# Patient Record
Sex: FEMALE | Race: WHITE
[De-identification: names, ages, dates, MRNs, and addresses within clinical notes are randomized per-mention and may not be internally consistent; named-entity substitution may affect disease eponyms.]

---

## 2017-03-28 ENCOUNTER — HOSPITAL ENCOUNTER (EMERGENCY)
Dept: HOSPITAL 11 - JP.ED | Age: 67
Discharge: HOME | End: 2017-03-28
Payer: MEDICARE

## 2017-03-28 VITALS — SYSTOLIC BLOOD PRESSURE: 167 MMHG | DIASTOLIC BLOOD PRESSURE: 83 MMHG

## 2017-03-28 DIAGNOSIS — Z79.899: ICD-10-CM

## 2017-03-28 DIAGNOSIS — M96.830: Primary | ICD-10-CM

## 2017-03-28 DIAGNOSIS — Z79.82: ICD-10-CM

## 2017-03-28 DIAGNOSIS — I10: ICD-10-CM

## 2017-03-28 NOTE — EDM.PDOC
[id number]   4d


LT LEG MOLE REMOVED/BLEEDING


Time Seen by Provider: 17 22:45


Source of Information: Reports: Patient, Family


History Limitations: Reports: No limitations





- History of Present Illness


INITIAL COMMENTS - FREE TEXT/NARRATIVE: 





66-year-old female with a recent small surgical procedure behind her left knee 

presents this she is concerned it might be bleeding.  She was told to come in 

if it bleeds uncontrollably.


Onset: today


  ** Left Lower Leg


Pain Score (Numeric/FACES): 3





- Related Data


 Allergies











Allergy/AdvReac Type Severity Reaction Status Date / Time


 


No Known Allergies Allergy   Verified 17 22:47











Home Meds: 


 Home Meds





Aspirin [Adult Low Dose Aspirin EC] 81 mg PO DAILY 16 [History]


Lisinopril [Lisinopril] 5 mg PO DAILY 16 [History]


Metoprolol Succinate [Metoprolol Succinate] 50 mg PO DAILY 16 [History]


Albuterol [IMW: Albuterol HFA] 2 puff INH Q4H PRN 16 [History]


Cholecalciferol (Vitamin D3) [Vitamin D] 5,000 unit PO DAILY 16 [History]


Multivitamin [Multivitamins] 1 tab PO DAILY 16 [History]


Betamethasone/Propylene Glyc [Betamethasone DP Aug 0.05%] 30 ml TP ASDIRECTED 10

/27/16 [History]


Hydrochlorothiazide [Hydrochlorothiazide] 1 tab PO DAILY 17 [History]











Past Medical History


Cardiovascular History: Reports: Hypertension


Respiratory History: Reports: Bronchitis, recurrent


Genitourinary History: Reports: Renal calculus, Other (see below)


Other Genitourinary History: kidney stone removed


OB/GYN History: Reports: Pregnancy


Musculoskeletal History: Reports: Other (see below)


Other Musculoskeletal History: sciatica


Dermatologic History: Reports: Eczema, Other (see below)


Other Dermatologic History: skin rash for swimmers itch.  recent excema scalp





- Infectious Disease History


Infectious Disease History: Reports: Chicken pox





- Past Surgical History


Female  Surgical History: Reports:  section





Social & Family History





- Family History


Family Medical History: Unobtainable





- Tobacco Use


Smoking Status *Q: Never Smoker


Second Hand Smoke Exposure: No





- Caffeine Use


Caffeine Use: Reports: Soda





- Alcohol Use


Days Per Week of Alcohol Use: 1


Number of Drinks Per Day: 2


Total Drinks Per Week: 2





- Recreational Drug Use


Recreational Drug Use: No


Drug Use in Last 12 Months: Yes





- Living Situation & Occupation


Living situation: Reports: , with spouse





ED ROS GENERAL





- Review of Systems


Review Of Systems: See Below


Constitutional: Denies: fever, chills


Respiratory: Denies: Shortness of Breath


Cardiovascular: Denies: Chest pain


GI/Abdominal: Denies: Abdominal pain


Skin: Reports: other (See HPI)





ED EXAM, GENERAL





- Physical Exam


Exam: See Below


Free Text/Narrative:: 





Patient has a large Band-Aid covering a recent surgical procedure removing a 

small mole behind her right knee.  There does appear to be a small amount of 

blood staining under the Band-Aid but nothing soaking through or oozing around 

the dressings.





Course





- Vital Signs


Last Recorded V/S: 


 Last Vital Signs











Temp  97.6 F   17 23:03


 


Pulse  71   17 23:03


 


Resp  16   17 23:03


 


BP  167/83 H  17 23:03


 


Pulse Ox  94 L  17 23:03














- Re-Assessments/Exams


Free Text/Narrative Re-Assessment/Exam: 





17 23:03


Patient was instructed by her primary doctor who did the procedure to change 

the Band-Aids once daily starting tomorrow morning.  I don't see any reason to 

change that schedule, she does not need an urgent dressing change at this time.

  She can return tonight if bleeding increases or starts to bleed through the 

bandages.





Departure





- Departure


Time of Disposition: 23:13


Disposition: Home, Self-Care 01


Condition: good


Clinical Impression: 


 Postoperative bleeding from incision





Instructions:  Mole Excision, Care After


Referrals: 


Nedra Camejo NP [Primary Care Provider] - 


Forms:  ED Department Discharge


Care Plan Goals: 


Keep wound covered with current bandage unless bleeding soaks through bandages 

or seems to be worsening.  Some pain is to be expected and is not as concerning 

as if the bleeding increases.  Change bandages tomorrow as scheduled.

## 2017-04-01 ENCOUNTER — HOSPITAL ENCOUNTER (EMERGENCY)
Dept: HOSPITAL 11 - JP.ED | Age: 67
Discharge: HOME | End: 2017-04-01
Payer: MEDICARE

## 2017-04-01 DIAGNOSIS — Z98.890: ICD-10-CM

## 2017-04-01 DIAGNOSIS — Z79.899: ICD-10-CM

## 2017-04-01 DIAGNOSIS — Z79.82: ICD-10-CM

## 2017-04-01 DIAGNOSIS — Z48.01: Primary | ICD-10-CM

## 2017-04-01 DIAGNOSIS — I10: ICD-10-CM

## 2017-04-01 NOTE — EDM.PDOC
ED HPI Skin/Rash





- General


Chief Complaint: Wound Recheck


Stated Complaint: INFECTION


Time Seen by Provider: 17 17:59


Source: Reports: Patient


History Limitations: Reports: No limitations





- History of Present Illness


INITIAL COMMENTS - FREE TEXT/NARRATIVE: 





History of present illness:


[Patient is here for a wound check.  She had a skin pain removed behind her 

left knee.  She saw her primary that had removed it and started on Bactrim 

because of concerns of infection.  She's been working in the yard all day and 

is just concerned that it is more infected and wanted someone to look at.  She'

s had no fevers she has other complaints.]





Review of systems: 


As per history of present illness and below otherwise all systems reviewed and 

negative.





Past medical history: 


As per history of present illness and as reviewed below otherwise 

noncontributory.





Surgical history: 


As per history of present illness and as reviewed below otherwise 

noncontributory.





Social history: 


No reported history of drug or alcohol abuse.





Family history: 


As per history of present illness and as reviewed below otherwise 

noncontributory.





Physical exam:


HEENT: Atraumatic, normocephalic, 


Lungs: Clear to auscultation


Heart: S1S2, regular


Extremities: Behind her left knee in the popliteal fossa she does have area of 

redness from where the tag was removed.  There is some erythema about the area 

but I think it outlines the bandage that was on there so maybe a slight 

allergic reaction from that.  Overall does not appear to be infected.


Neuro: Awake, alert, oriented. 





Diagnostics:


[]





Therapeutics:


[]





Impression: 


[Wound check followup]





Plan:


[Am recommending that when she showers she washes it with soap and water 

something she has not done yet.  And then use a little antibiotic ointment on 

the raw part of the wound.]





Definitive disposition and diagnosis as appropriate pending reevaluation and 

review of above.











- Related Data


 Allergies











Allergy/AdvReac Type Severity Reaction Status Date / Time


 


No Known Allergies Allergy   Verified 17 22:47











Home Meds: 


 Ambulatory Orders











 Medication  Instructions  Recorded  Confirmed


 


Aspirin [Adult Low Dose Aspirin EC] 81 mg PO DAILY 16


 


Lisinopril [Lisinopril] 5 mg PO DAILY 16


 


Metoprolol Succinate [Metoprolol 50 mg PO DAILY 16





Succinate]   


 


Cholecalciferol (Vitamin D3) 5,000 unit PO DAILY 16





[Vitamin D]   


 


Multivitamin [Multivitamins] 1 tab PO DAILY 16


 


Betamethasone/Propylene Glyc 30 ml TP ASDIRECTED 10/27/16 03/28/17





[Betamethasone DP Aug 0.05%]   


 


Hydrochlorothiazide 1 tab PO DAILY 17





[Hydrochlorothiazide]   


 


Sulfamethoxazole/Trimethoprim  17





[Take Home: Sulfameth/Trimet   





800-160MG, 2 Pack]   














Past Medical History


HEENT History: Reports: Cataract, Impaired vision


Cardiovascular History: Reports: Hypertension


Respiratory History: Reports: Bronchitis, recurrent


Gastrointestinal History: Reports: Colon polyp


Genitourinary History: Reports: Renal calculus, Other (see below)


Other Genitourinary History: kidney stone removed


OB/GYN History: Reports: Pregnancy


Musculoskeletal History: Reports: Other (see below)


Other Musculoskeletal History: sciatica


Dermatologic History: Reports: Eczema, Other (see below)


Other Dermatologic History: skin rash for swimmers itch.  recent excema scalp





- Infectious Disease History


Infectious Disease History: Reports: Chicken pox





- Past Surgical History


GI Surgical History: Reports: Colonoscopy, Polypectomy


Female  Surgical History: Reports:  section





Social & Family History





- Family History


Family Medical History: Unobtainable





- Tobacco Use


Smoking Status *Q: Never Smoker


Second Hand Smoke Exposure: No





- Caffeine Use


Caffeine Use: Reports: Soda





- Alcohol Use


Days Per Week of Alcohol Use: 1


Number of Drinks Per Day: 2


Total Drinks Per Week: 2





- Recreational Drug Use


Recreational Drug Use: No


Drug Use in Last 12 Months: Yes





- Living Situation & Occupation


Living situation: Reports: , with spouse





ED ROS GENERAL





- Review of Systems


Review Of Systems: ROS reveals no pertinent complaints other than HPI.





ED EXAM, SKIN/RASH


Exam: See Below





Course





- Vital Signs


Last Recorded V/S: 





 Last Vital Signs











Temp  37.0 C   17 18:05


 


Pulse      


 


Resp  14   17 18:05


 


BP      


 


Pulse Ox  97   17 18:05














Departure





- Departure


Time of Disposition: 18:40


Disposition: Home, Self-Care 01


Condition: good


Clinical Impression: 


 Encounter for wound re-check





Forms:  ED Department Discharge


Additional Instructions: 


Continue your antibiotics and wash the wound as we discussed and follow up with 

your primary if you believe it is getting worse but I think it should heal up 

fine

## 2017-07-15 ENCOUNTER — HOSPITAL ENCOUNTER (EMERGENCY)
Dept: HOSPITAL 11 - JP.ED | Age: 67
Discharge: HOME | End: 2017-07-15
Payer: MEDICARE

## 2017-07-15 VITALS — DIASTOLIC BLOOD PRESSURE: 76 MMHG | SYSTOLIC BLOOD PRESSURE: 157 MMHG

## 2017-07-15 DIAGNOSIS — H57.8: Primary | ICD-10-CM

## 2017-07-15 DIAGNOSIS — Z79.899: ICD-10-CM

## 2017-07-15 DIAGNOSIS — Z79.82: ICD-10-CM

## 2017-07-15 PROCEDURE — 99283 EMERGENCY DEPT VISIT LOW MDM: CPT

## 2017-07-15 NOTE — EDM.PDOC
ED HPI GENERAL MEDICAL PROBLEM





- General


Chief Complaint: Eye Problems


Stated Complaint: EYE SURGERY MONDAY  WATERING AND RED


Time Seen by Provider: 07/15/17 11:06


Source of Information: Reports: Patient


History Limitations: Reports: No Limitations





- History of Present Illness


INITIAL COMMENTS - FREE TEXT/NARRATIVE: 





66 yo female presents to ER with red watering right eye. 6 days ago had 

cataract removed.  Surgery went well she had post op check one day following 

surgery without no problems.  Last evening walked through animal buildings at 

the [facility] and eye became irritated and watering.  clear discharge.  was able to 

sleep last evening when she woke this AM eye remained red with  increased 

watering.  the discharge has remained clear.  no vision changes.  she has been 

wearing her prescription glasses to be able to see with left eye and this does 

cause strain on right eye.  She was in the sun this AM.  watering has 

decreased.  denies pain in eye but does have mild pain above eye.





- Related Data


 Allergies











Allergy/AdvReac Type Severity Reaction Status Date / Time


 


No Known Allergies Allergy   Verified 17 22:47











Home Meds: 


 Home Meds





Aspirin [Adult Low Dose Aspirin EC] 81 mg PO DAILY 16 [History]


Lisinopril [Lisinopril] 5 mg PO DAILY 16 [History]


Metoprolol Succinate [Metoprolol Succinate] 50 mg PO DAILY 16 [History]


Cholecalciferol (Vitamin D3) [Vitamin D] 5,000 unit PO DAILY 16 [History]


Multivitamin [Multivitamins] 1 tab PO DAILY 16 [History]


Betamethasone/Propylene Glyc [Betamethasone DP Aug 0.05%] 30 ml TP ASDIRECTED 10

/27/16 [History]


Hydrochlorothiazide [Hydrochlorothiazide] 1 tab PO DAILY 17 [History]


prednisoLONE Acetate [Pred Forte 1% Ophth Susp]  07/15/17 [History]











Past Medical History


HEENT History: Reports: Cataract, Impaired Vision


Cardiovascular History: Reports: Hypertension


Respiratory History: Reports: Bronchitis, Recurrent


Gastrointestinal History: Reports: Colon Polyp


Genitourinary History: Reports: Renal Calculus, Other (See Below)


Other Genitourinary History: kidney stone removed


OB/GYN History: Reports: Pregnancy


Musculoskeletal History: Reports: Other (See Below)


Other Musculoskeletal History: sciatica


Dermatologic History: Reports: Eczema, Other (See Below)


Other Dermatologic History: skin rash for swimmers itch.  recent excema scalp





- Infectious Disease History


Infectious Disease History: Reports: Chicken Pox





- Past Surgical History


Female  Surgical History: Reports:  Section





Social & Family History





- Family History


Family Medical History: Unobtainable





- Tobacco Use


Smoking Status *Q: Never Smoker


Second Hand Smoke Exposure: No





- Caffeine Use


Caffeine Use: Reports: Soda





- Alcohol Use


Days Per Week of Alcohol Use: 1


Number of Drinks Per Day: 2


Total Drinks Per Week: 2





- Recreational Drug Use


Recreational Drug Use: No


Drug Use in Last 12 Months: Yes





- Living Situation & Occupation


Living situation: Reports: , with Spouse





ED ROS GENERAL





- Review of Systems


Review Of Systems: See Below


Constitutional: Denies: Fever


HEENT: Reports: Eye Discharge


Respiratory: Denies: Shortness of Breath, Wheezing


Cardiovascular: Denies: Chest Pain





ED EXAM GENERAL W FULL EYE





- Physical Exam


Exam: See Below


Exam Limited By: No Limitations


General Appearance: Alert, WD/WN, No Apparent Distress


Eye Exam: Bilateral Eye: PERRL


Eyelids: Right: Edema (very mild), Left: Normal Appearance


Conjunctiva & Sclera: Right: Injected (sclera), Left: Normal Appearance


Cornea Exam: Bilateral: Normal Appearance


Extraocular Movements: Bilateral: Intact


Pupils: Normal Accommodation


Pupillary Reaction: Bilateral: Brisk


Posterior Chamber: Bilateral: Normal Funduscopic


Ears: Normal External Exam, Normal Canal


Nose: Normal Inspection, Normal Mucosa


Throat/Mouth: Normal Inspection, Normal Lips


Head: Atraumatic, Normocephalic


Neck: Normal Inspection, Supple, Non-Tender


Respiratory/Chest: No Respiratory Distress


Cardiovascular: Regular Rate, Rhythm





Course





- Vital Signs


Last Recorded V/S: 


 Last Vital Signs











Temp  36.7 C   07/15/17 10:32


 


Pulse  72   07/15/17 10:32


 


Resp  15   07/15/17 10:32


 


BP  157/76 H  07/15/17 10:32


 


Pulse Ox  95   07/15/17 10:32














- Orders/Labs/Meds


Meds: 


Medications














Discontinued Medications














Generic Name Dose Route Start Last Admin





  Trade Name Freq  PRN Reason Stop Dose Admin


 


Tetracaine HCl  2 ml  07/15/17 11:19  07/15/17 11:25





  Tetracaine 0.5% Steri-Unit Sol  EYERT  07/15/17 11:20  1 drop





  ASDIRECTED ONE   Administration














- Re-Assessments/Exams


Free Text/Narrative Re-Assessment/Exam: 





07/15/17 20:09


post cataract surgery, Injection of right eye started yesterday with clear 

tearing of eye, injection remained this AM with tearing this morning that has 

now resolved.  no pain in eye.  No vision changes.  She denies seeing floaters 

or shadows.  denies pain in head.  tetracaine placed in eye and pressure was 

attempted with pin.  Unable to achieve an accurate pressure after multiple 

attempts.  pt has appt scheduled in 2 days with eye surgeon.  Will start use of 

steroid gtt today and also antibiotic drops through appt   


07/15/17 20:18








Departure





- Departure


Time of Disposition: 11:57


Disposition: Home, Self-Care 01


Condition: Good


Clinical Impression: 


 Eye irritation








- Discharge Information


Instructions:  Cataract


Referrals: 


Nedra Camejo NP [Primary Care Provider] - 


Forms:  ED Department Discharge


Additional Instructions: 


protect eye from sun


continue use of antibiotic drops through Monday's appointment


avoid dust or other irritation of the eye


avoid extended times of bending over or bearing down

## 2017-09-13 ENCOUNTER — HOSPITAL ENCOUNTER (EMERGENCY)
Dept: HOSPITAL 11 - JP.ED | Age: 67
Discharge: HOME | End: 2017-09-13
Payer: MEDICARE

## 2017-09-13 VITALS — SYSTOLIC BLOOD PRESSURE: 159 MMHG | DIASTOLIC BLOOD PRESSURE: 79 MMHG

## 2017-09-13 DIAGNOSIS — N13.2: Primary | ICD-10-CM

## 2017-09-13 DIAGNOSIS — H54.7: ICD-10-CM

## 2017-09-13 DIAGNOSIS — Z79.82: ICD-10-CM

## 2017-09-13 DIAGNOSIS — Z79.899: ICD-10-CM

## 2017-09-13 DIAGNOSIS — I10: ICD-10-CM

## 2017-09-13 PROCEDURE — 85025 COMPLETE CBC W/AUTO DIFF WBC: CPT

## 2017-09-13 PROCEDURE — 96361 HYDRATE IV INFUSION ADD-ON: CPT

## 2017-09-13 PROCEDURE — 74176 CT ABD & PELVIS W/O CONTRAST: CPT

## 2017-09-13 PROCEDURE — 96374 THER/PROPH/DIAG INJ IV PUSH: CPT

## 2017-09-13 PROCEDURE — 36415 COLL VENOUS BLD VENIPUNCTURE: CPT

## 2017-09-13 PROCEDURE — 99284 EMERGENCY DEPT VISIT MOD MDM: CPT

## 2017-09-13 PROCEDURE — 80053 COMPREHEN METABOLIC PANEL: CPT

## 2017-09-13 PROCEDURE — 96375 TX/PRO/DX INJ NEW DRUG ADDON: CPT

## 2017-09-13 PROCEDURE — 81001 URINALYSIS AUTO W/SCOPE: CPT

## 2017-09-13 NOTE — EDM.PDOC
ED HPI GENERAL MEDICAL PROBLEM





- General


Chief Complaint: Abdominal Pain


Stated Complaint: RT SIDE ABD AND BACK PAIN


Time Seen by Provider: 17 18:06


Source of Information: Reports: Patient


History Limitations: Reports: No Limitations





- History of Present Illness


INITIAL COMMENTS - FREE TEXT/NARRATIVE: 





History of present illness:


[Patient has a history of kidney stones and presents with right flank pain 

radiating into her perineal area. It has been going on for 3-4 days. He's had 

no fevers or chills she has had to have lithotripsy in the past she's probably 

had about 10 or 12 bouts of kidney stones in her life]





Review of systems: 


As per history of present illness and below otherwise all systems reviewed and 

negative.





Past medical history: 


As per history of present illness and as reviewed below otherwise 

noncontributory.





Surgical history: 


As per history of present illness and as reviewed below otherwise 

noncontributory.





Social history: 


No reported history of drug or alcohol abuse.





Family history: 


As per history of present illness and as reviewed below otherwise 

noncontributory.





Physical exam:


HEENT: Atraumatic, normocephalic, 


Lungs: Clear to auscultation, breath sounds equal bilaterally, chest nontender.


Heart: S1S2, regular, negative for clicks, rubs, or JVD.


Abdomen: Soft, nondistended, nontender. Negative for masses or 

hepatosplenomegaly. She does have some right costovertebral tenderness to 

percussion


Extremities: Atraumatic, negative for cords or calf pain. Neurovascular 

unremarkable.


Neuro: Awake, alert, oriented.  Exam nonfocal.





Diagnostics:


[CT demonstrates a 4 mm obstructing calculus in the distal right ureter with 

severe right hydronephrosis. CBC and complete metabolic panel were also done.]





Therapeutics:


[She received IV fluids and IV Toradol and had good relief with this]





Impression: 


[Urolithiasis]





Plan:


[I'm providing her with Norco one to 2 by mouth every 3-4 hours #18 of the 

's and we'll provide her a disc of her CT and she'll call her urologist in 

[place] tomorrow if she is still having trouble and has not passed the stone.]





Definitive disposition and diagnosis as appropriate pending reevaluation and 

review of above.








  ** Right Lower Back


Pain Score (Numeric/FACES): 6





- Related Data


 Allergies











Allergy/AdvReac Type Severity Reaction Status Date / Time


 


No Known Allergies Allergy   Verified 17 22:47











Home Meds: 


 Home Meds





Aspirin [Adult Low Dose Aspirin EC] 81 mg PO DAILY 16 [History]


Lisinopril [Lisinopril] 5 mg PO DAILY 16 [History]


Metoprolol Succinate [Metoprolol Succinate] 50 mg PO DAILY 16 [History]


Cholecalciferol (Vitamin D3) [Vitamin D] 5,000 unit PO DAILY 16 [History]


Multivitamin [Multivitamins] 1 tab PO DAILY 16 [History]


Betamethasone/Propylene Glyc [Betamethasone DP Aug 0.05%] 30 ml TP ASDIRECTED 10

/27/16 [History]


Hydrochlorothiazide [Hydrochlorothiazide] 1 tab PO DAILY 17 [History]


prednisoLONE Acetate [Pred Forte 1% Ophth Susp] 2 drop EYELF DAILY 07/15/17 [

History]











Past Medical History


HEENT History: Reports: Cataract, Impaired Vision


Cardiovascular History: Reports: Hypertension


Respiratory History: Reports: Bronchitis, Recurrent


Gastrointestinal History: Reports: Colon Polyp


Genitourinary History: Reports: Renal Calculus, Other (See Below)


Other Genitourinary History: kidney stone removed


OB/GYN History: Reports: Pregnancy


Musculoskeletal History: Reports: Other (See Below)


Other Musculoskeletal History: sciatica


Dermatologic History: Reports: Eczema


Other Dermatologic History: skin rash for swimmers itch.  recent excema scalp





- Infectious Disease History


Infectious Disease History: Reports: Chicken Pox





- Past Surgical History


HEENT Surgical History: Reports: Cataract Surgery


Female  Surgical History: Reports:  Section, Kidney stone extraction, 

Lithotripsy/ESWL





Social & Family History





- Family History


Family Medical History: Unobtainable





- Tobacco Use


Smoking Status *Q: Never Smoker


Second Hand Smoke Exposure: No





- Caffeine Use


Caffeine Use: Reports: None





- Alcohol Use


Days Per Week of Alcohol Use: 1


Number of Drinks Per Day: 2


Total Drinks Per Week: 2





- Recreational Drug Use


Recreational Drug Use: No


Drug Use in Last 12 Months: Yes





- Living Situation & Occupation


Living situation: Reports: , with Spouse





ED ROS GENERAL





- Review of Systems


Review Of Systems: ROS reveals no pertinent complaints other than HPI.





ED EXAM, GENERAL





- Physical Exam


Exam: See Below





Course





- Vital Signs


Last Recorded V/S: 





 Last Vital Signs











Temp  37.4 C   17 19:07


 


Pulse  68   17 19:07


 


Resp  17   17 19:07


 


BP  159/79 H  17 19:07


 


Pulse Ox  97   17 19:07














- Orders/Labs/Meds


Orders: 





 Active Orders 24 hr











 Category Date Time Status


 


 Abdomen Pelvis wo Cont [CT] Stat Exams  17 18:12 Taken


 


 Sodium Chloride 0.9% [Normal Saline] 1,000 ml Med  17 18:15 Active





 IV ASDIRECTED   








 Medication Orders





Sodium Chloride (Normal Saline)  1,000 mls @ 500 mls/hr IV ASDIRECTED SOLOMON


   Last Admin: 17 18:54  Dose: 500 mls/hr








Labs: 





 Laboratory Tests











  17 Range/Units





  17:50 18:23 18:23 


 


WBC   8.6   (4.5-11.0)  K/uL


 


RBC   5.20   (3.30-5.50)  M/uL


 


Hgb   15.6 H   (12.0-15.0)  g/dL


 


Hct   47.3   (36.0-48.0)  %


 


MCV   91   (80-98)  fL


 


MCH   30   (27-31)  pg


 


MCHC   33   (32-36)  %


 


Plt Count   181   (150-400)  K/uL


 


Neut % (Auto)   70 H   (36-66)  %


 


Lymph % (Auto)   12 L   (24-44)  %


 


Mono % (Auto)   15 H   (2-6)  %


 


Eos % (Auto)   1 L   (2-4)  %


 


Baso % (Auto)   1   (0-1)  %


 


Sodium    142  (140-148)  mmol/L


 


Potassium    3.8  (3.6-5.2)  mmol/L


 


Chloride    108  (100-108)  mmol/L


 


Carbon Dioxide    26  (21-32)  mmol/L


 


Anion Gap    8.3  (5.0-14.0)  mmol/L


 


BUN    17  (7-18)  mg/dL


 


Creatinine    1.2 H  (0.6-1.0)  mg/dL


 


Est Cr Clr Drug Dosing    39.82  mL/min


 


Estimated GFR (MDRD)    45 L  (>60)  


 


Glucose    102  ()  mg/dL


 


Calcium    9.3  (8.5-10.1)  mg/dL


 


Total Bilirubin    0.7  (0.2-1.0)  mg/dL


 


AST    33  (15-37)  U/L


 


ALT    48  (12-78)  U/L


 


Alkaline Phosphatase    54  ()  U/L


 


Total Protein    7.8  (6.4-8.2)  g/dL


 


Albumin    4.0  (3.4-5.0)  g/dL


 


Globulin    3.8 H  (2.3-3.5)  g/dL


 


Albumin/Globulin Ratio    1.1 L  (1.2-2.2)  


 


Urine Color  Yellow    


 


Urine Appearance  Clear    


 


Urine pH  5.0    (4.5-8.0)  


 


Ur Specific Gravity  1.030    (1.008-1.030)  


 


Urine Protein  Negative    (NEGATIVE)  mg/dL


 


Urine Glucose (UA)  Normal    (NEGATIVE)  mg/dL


 


Urine Ketones  Negative    (NEGATIVE)  mg/dL


 


Urine Occult Blood  Large    (NEGATIVE)  


 


Urine Nitrite  Negative    (NEGATIVE)  


 


Urine Bilirubin  Negative    (NEGATIVE)  


 


Urine Urobilinogen  Normal    (NORMAL)  mg/dL


 


Ur Leukocyte Esterase  Negative    (NEGATIVE)  


 


Urine RBC  5-10 H    (0-5)  


 


Urine WBC  0-5    (0-5)  


 


Ur Epithelial Cells  Rare    


 


Amorphous Sediment  Not seen    


 


Urine Bacteria  Not seen    


 


Urine Mucus  Not seen    


 


Urine Other      











Meds: 





Medications











Generic Name Dose Route Start Last Admin





  Trade Name Freq  PRN Reason Stop Dose Admin


 


Sodium Chloride  1,000 mls @ 500 mls/hr  17 18:15  17 18:54





  Normal Saline  IV   500 mls/hr





  ASDIRECTED SOLOMON   Administration














Discontinued Medications














Generic Name Dose Route Start Last Admin





  Trade Name Frephil  PRN Reason Stop Dose Admin


 


Ketorolac Tromethamine  30 mg  17 18:14  17 18:55





  Toradol  IVPUSH  17 18:15  30 mg





  ONETIME ONE   Administration


 


Ondansetron HCl  4 mg  17 18:14  17 18:55





  Zofran  IVPUSH  17 18:15  4 mg





  ONETIME ONE   Administration


 


Tamsulosin HCl  0.4 mg  17 18:15  17 18:55





  Flomax  PO  17 18:16  0.4 mg





  ONETIME ONE   Administration














Departure





- Departure


Time of Disposition: 20:02


Disposition: Home, Self-Care 01


Condition: Good


Clinical Impression: 


Urolithiasis


Qualifiers:


 Urinary calculus location: ureter Qualified Code(s): N20.1 - Calculus of ureter








- Discharge Information


Referrals: 


Nedra Camejo NP [Primary Care Provider] - 


Additional Instructions: 


Please call your urologist tomorrow if you had not passed her stone by tomorrow 

morning.





- My Orders


Last 24 Hours: 





My Active Orders





17 18:12


Abdomen Pelvis wo Cont [CT] Stat 





17 18:15


Sodium Chloride 0.9% [Normal Saline] 1,000 ml IV ASDIRECTED 














- Assessment/Plan


Last 24 Hours: 





My Active Orders





17 18:12


Abdomen Pelvis wo Cont [CT] Stat 





17 18:15


Sodium Chloride 0.9% [Normal Saline] 1,000 ml IV ASDIRECTED

## 2017-09-19 ENCOUNTER — HOSPITAL ENCOUNTER (EMERGENCY)
Dept: HOSPITAL 11 - JP.ED | Age: 67
Discharge: HOME | End: 2017-09-19
Payer: MEDICARE

## 2017-09-19 VITALS — SYSTOLIC BLOOD PRESSURE: 149 MMHG | DIASTOLIC BLOOD PRESSURE: 85 MMHG

## 2017-09-19 DIAGNOSIS — I10: ICD-10-CM

## 2017-09-19 DIAGNOSIS — Z79.899: ICD-10-CM

## 2017-09-19 DIAGNOSIS — Z79.82: ICD-10-CM

## 2017-09-19 DIAGNOSIS — H20.9: Primary | ICD-10-CM

## 2017-09-19 PROCEDURE — 99283 EMERGENCY DEPT VISIT LOW MDM: CPT

## 2017-09-19 NOTE — EDM.PDOC
ED HPI GENERAL MEDICAL PROBLEM





- General


Chief Complaint: Eye Problems


Stated Complaint: LEFT EYE PAIN


Time Seen by Provider: 17 04:10


Source of Information: Reports: Patient, Family


History Limitations: Reports: No Limitations





- History of Present Illness


INITIAL COMMENTS - FREE TEXT/NARRATIVE: 





67-year-old female who had cataract surgery in July on both eyes, has had 

recurring iritis since surgery. Last night she was playing cards and developed 

redness in her left eye and overnight the pain worsened. She has prednisolone 

drops that she uses but she is out. No visual disturbance but she is 

photophobic.


Onset: Gradual (Over the past 12 hours)


Location: Reports: Other (Left eye)


Severity: Moderate


  ** Left Eye


Pain Score (Numeric/FACES): 10





- Related Data


 Allergies











Allergy/AdvReac Type Severity Reaction Status Date / Time


 


No Known Allergies Allergy   Verified 17 04:08











Home Meds: 


 Home Meds





Aspirin [Adult Low Dose Aspirin EC] 81 mg PO DAILY 16 [History]


Lisinopril [Lisinopril] 5 mg PO DAILY 16 [History]


Metoprolol Succinate [Metoprolol Succinate] 50 mg PO DAILY 16 [History]


Cholecalciferol (Vitamin D3) [Vitamin D] 5,000 unit PO DAILY 16 [History]


Multivitamin [Multivitamins] 1 tab PO DAILY 16 [History]


Betamethasone/Propylene Glyc [Betamethasone DP Aug 0.05%] 30 ml TP ASDIRECTED 10

/27/16 [History]


Hydrochlorothiazide [Hydrochlorothiazide] 1 tab PO DAILY 17 [History]


prednisoLONE Acetate [Pred Forte 1% Ophth Susp] 2 drop EYELF DAILY 07/15/17 [

History]











Past Medical History


HEENT History: Reports: Cataract, Impaired Vision


Cardiovascular History: Reports: Hypertension


Respiratory History: Reports: Bronchitis, Recurrent


Gastrointestinal History: Reports: Colon Polyp


Genitourinary History: Reports: Renal Calculus, Other (See Below)


Other Genitourinary History: kidney stone removed


OB/GYN History: Reports: Pregnancy


Musculoskeletal History: Reports: Other (See Below)


Other Musculoskeletal History: sciatica


Dermatologic History: Reports: Eczema


Other Dermatologic History: skin rash for swimmers itch.  recent excema scalp





- Infectious Disease History


Infectious Disease History: Reports: Chicken Pox





- Past Surgical History


HEENT Surgical History: Reports: Cataract Surgery


Female  Surgical History: Reports:  Section, Kidney stone extraction, 

Lithotripsy/ESWL





Social & Family History





- Family History


Family Medical History: Unobtainable





- Tobacco Use


Smoking Status *Q: Never Smoker


Second Hand Smoke Exposure: Yes





- Caffeine Use


Caffeine Use: Reports: Soda





- Alcohol Use


Days Per Week of Alcohol Use: 1


Number of Drinks Per Day: 2


Total Drinks Per Week: 2





- Recreational Drug Use


Recreational Drug Use: No


Drug Use in Last 12 Months: Yes





- Living Situation & Occupation


Living situation: Reports: , with Spouse





ED ROS GENERAL





- Review of Systems


Review Of Systems: See Below


Constitutional: Denies: Fever, Chills


Respiratory: Denies: Shortness of Breath


Cardiovascular: Denies: Chest Pain


GI/Abdominal: Denies: Nausea, Vomiting


: Reports: Other (She is currently struggling with right flank pain from an 

unpassed ureteral stone)


Skin: Reports: No Symptoms





ED EXAM GENERAL W FULL EYE





- Physical Exam


Exam: See Below


Exam Limited By: No Limitations


General Appearance: Alert, Mild Distress (Looks uncomfortable)


Eye Exam: Right Eye: Conjunctival Injection (Marked conjunctival and scleral 

injection), Bilateral Eye: PERRL


IOP (L) in mmH


IOP Measure with (Equipment): Tonopen


Eyelids: Left: Erythema


Conjunctiva & Sclera: Left: Injected


Extraocular Movements: Bilateral: Intact


Pupillary Size: Bilateral: 4 mm


Respiratory/Chest: No Respiratory Distress


Neurological: Alert, Oriented





Course





- Vital Signs


Last Recorded V/S: 


 Last Vital Signs











Temp  97.1 F   17 04:00


 


Pulse  80   17 04:00


 


Resp  18   17 04:00


 


BP  149/85 H  17 04:00


 


Pulse Ox  92 L  17 04:00














- Orders/Labs/Meds


Meds: 


Medications














Discontinued Medications














Generic Name Dose Route Start Last Admin





  Trade Name Freq  PRN Reason Stop Dose Admin


 


Prednisolone Acetate  1 ml  17 04:45  17 05:10





  Pred Forte 1% Ophth Susp  EYELF  17 04:46  1 drop





  ONETIME ONE   Administration


 


Proparacaine HCl  1 ml  17 04:32  





  Proparacaine 0.5% Ophth Soln  EYELF  17 04:33  





  ONETIME ONE   


 


Proparacaine HCl  Confirm  17 04:34  





  Proparacaine 0.5% Ophth Soln  Administered  17 04:35  





  Dose   





  15 ml   





  .ROUTE   





  .STK-MED ONE   














- Re-Assessments/Exams


Free Text/Narrative Re-Assessment/Exam: 





17 04:53


After proparacaine anesthesia the patient did not have significant improvement. 

IOP measure with Terry-Pen was 14. Patient likely is a recurring iritis. She was 

given prednisolone drops and apply 2 drops to her left eye, she has Vicodin at 

home. She will take 1 Vicodin when she gets home and see the eye doctor in 3-4 

hours when they open.





Departure





- Departure


Time of Disposition: 05:17


Disposition: Home, Self-Care 01


Condition: Good


Clinical Impression: 


 Iritis








- Discharge Information


Instructions:  Uveitis, Easy-to-Read


Referrals: 


Nedra Camejo NP [Primary Care Provider] - 


Forms:  ED Department Discharge


Care Plan Goals: 


Take any Narco for pain when you get home, cool compresses to the eye may help 

and recheck with the optometrist tomorrow if possible.

## 2018-04-25 ENCOUNTER — HOSPITAL ENCOUNTER (EMERGENCY)
Dept: HOSPITAL 11 - JP.ED | Age: 68
Discharge: HOME | End: 2018-04-25
Payer: MEDICARE

## 2018-04-25 VITALS — DIASTOLIC BLOOD PRESSURE: 86 MMHG | SYSTOLIC BLOOD PRESSURE: 140 MMHG

## 2018-04-25 DIAGNOSIS — Z79.82: ICD-10-CM

## 2018-04-25 DIAGNOSIS — R60.0: ICD-10-CM

## 2018-04-25 DIAGNOSIS — L03.031: Primary | ICD-10-CM

## 2018-04-25 DIAGNOSIS — I10: ICD-10-CM

## 2018-04-25 DIAGNOSIS — Z79.899: ICD-10-CM

## 2018-04-25 NOTE — EDM.PDOC
ED HPI GENERAL MEDICAL PROBLEM





- General


Chief Complaint: Lower Extremity Injury/Pain


Stated Complaint: RT FOOT PAIN


Time Seen by Provider: 18 21:09


Source of Information: Reports: Patient


History Limitations: Reports: No Limitations





- History of Present Illness


INITIAL COMMENTS - FREE TEXT/NARRATIVE: 


worried about blood pressure


-has not been taking her medications for the past week.





Lower leg edema  


-has troubles with her Kidneys, history of recurrent kidney stones. has been 

forgetting to take her medications. last dose of medications was last week. did 

take one pill this morning, has medications at pharmacy for .





right foot and 2nd toe redness and pain


-She reports last night was doing something on the floor, when she got up, her 

toe "snagged", has been red and painful. rates pain at 10


Onset: Gradual


Duration: Week(s):


Location: Reports: Lower Extremity, Left, Lower Extremity, Right


Quality: Reports: Other (rt toe and foot throbbing pain, rates 7/10)


Severity: Moderate


Improves with: Reports: Rest


Worsens with: Reports: Movement


  ** Right Foot


Pain Score (Numeric/FACES): 5





- Related Data


 Allergies











Allergy/AdvReac Type Severity Reaction Status Date / Time


 


No Known Allergies Allergy   Verified 17 04:08











Home Meds: 


 Home Meds





Aspirin [Adult Low Dose Aspirin EC] 81 mg PO DAILY 16 [History]


Lisinopril 5 mg PO DAILY 16 [History]


Metoprolol Succinate 50 mg PO DAILY 16 [History]


Cholecalciferol (Vitamin D3) [Vitamin D] 5,000 unit PO DAILY 16 [History]


Multivitamin [Multivitamins] 1 tab PO DAILY 16 [History]


Betamethasone/Propylene Glyc [Betamethasone DP Aug 0.05%] 30 ml TP ASDIRECTED 10

/27/16 [History]


prednisoLONE Acetate [Pred Forte 1% Ophth Susp] 2 drop EYELF DAILY 07/15/17 [

History]


Chlorthalidone 25 mg PO DAILY 18 [History]


Tamsulosin HCl 0.4 mg PO DAILY 18 [History]











Past Medical History


HEENT History: Reports: Cataract, Impaired Vision


Cardiovascular History: Reports: Hypertension


Respiratory History: Reports: Bronchitis, Recurrent


Gastrointestinal History: Reports: Colon Polyp


Genitourinary History: Reports: Renal Calculus, Other (See Below)


Other Genitourinary History: kidney stone removed


OB/GYN History: Reports: Pregnancy


Musculoskeletal History: Reports: Other (See Below)


Other Musculoskeletal History: sciatica


Dermatologic History: Reports: Eczema


Other Dermatologic History: skin rash for swimmers itch.  recent excema scalp





- Infectious Disease History


Infectious Disease History: Reports: Chicken Pox





- Past Surgical History


HEENT Surgical History: Reports: Cataract Surgery


Female  Surgical History: Reports:  Section, Kidney stone extraction, 

Lithotripsy/ESWL





Social & Family History





- Family History


Family Medical History: Unobtainable





- Tobacco Use


Smoking Status *Q: Unknown Ever Smoked


Second Hand Smoke Exposure: No





- Caffeine Use


Caffeine Use: Reports: Coffee





- Alcohol Use


Days Per Week of Alcohol Use: 1


Number of Drinks Per Day: 2


Total Drinks Per Week: 2





- Recreational Drug Use


Recreational Drug Use: No


Drug Use in Last 12 Months: Yes





- Living Situation & Occupation


Living situation: Reports: , with Spouse





Review of Systems





- Review of Systems


Review Of Systems: See Below


Constitutional: Reports: Other (foot pain)


Eyes: Reports: No Symptoms


Ears: Reports: No Symptoms


Nose: Reports: No Symptoms


Mouth/Throat: Reports: No Symptoms


Respiratory: Reports: No Symptoms


Cardiovascular: Reports: No Symptoms


GI/Abdominal: Reports: No Symptoms


Genitourinary: Reports: No Symptoms


Musculoskeletal: Reports: No Symptoms, Foot Pain, Muscle Pain, Muscle Stiffness


Skin: Reports: Change in Color (rt  2nd toe), Other (bandage left forehead from 

skin procedure 18)


Neurological: Reports: No Symptoms


Psychiatric: Reports: No Symptoms





ED EXAM, GENERAL





- Physical Exam


Exam: See Below


Exam Limited By: No Limitations


General Appearance: Alert


Ears: Normal External Exam


Nose: Normal Inspection


Throat/Mouth: Normal Inspection


Head: Atraumatic, Normocephalic


Neck: Normal Inspection, Supple, Non-Tender, Full Range of Motion


Respiratory/Chest: No Respiratory Distress, Lungs Clear, Normal Breath Sounds, 

No Accessory Muscle Use, Chest Non-Tender


Cardiovascular: Regular Rate, Rhythm, No Murmur


GI/Abdominal: Normal Bowel Sounds, Soft, Non-Tender


Back Exam: Normal Inspection


Extremities: Pedal Edema (2+pitting edema bilateral lower legs.), Increased 

Warmth (rt distal foot and rt 2nd toe), Redness (rt 2nd toe), Other


Neurological: Alert, Oriented, No Motor/Sensory Deficits


Psychiatric: Normal Affect, Normal Mood


Skin Exam: Warm, Dry, Intact, Normal Color, No Rash


Lymphatic: No Adenopathy





Course





- Vital Signs


Last Recorded V/S: 


 Last Vital Signs











Temp  37.5 C   18 21:19


 


Pulse  70   18 21:19


 


Resp  16   18 21:19


 


BP  140/86   18 21:19


 


Pulse Ox  100   18 21:19














- Orders/Labs/Meds


Orders: 


 Active Orders 24 hr











 Category Date Time Status


 


 Foot Comp Min 3V Rt [CR] Stat Exams  18 21:30 Taken














- Radiology Interpretation


Free Text/Narrative:: 





xray of right foot with no acute bony injury noted. radiology report pending, 

reviewed with pain.





Departure





- Departure


Time of Disposition: 22:31


Disposition: Home, Self-Care 01


Condition: Good


Clinical Impression: 


 Abscess or cellulitis of foot, Edema extremities








- Discharge Information


Referrals: 


Phillip Cooley MD [Primary Care Provider] - 


Forms:  ED Department Discharge


Care Plan Goals: 


infection of right foot including 2nd toe


-xray negative for broken bone, complete radiology report pending


-start tonight; Keflex 500mg one two times a day for 7 days


-elevated legs as much as possible, no prolonged walking, standing





Edema of lower legs


-restart "Kidney" medication as soon as possible


-take medications as directed.





Return to Clinic, Urgent Care or ER for any worsen of symptoms or not improved.





- Problem List & Annotations


(1) Abscess or cellulitis of foot


SNOMED Code(s): 195245313


   Code(s): L03.119 - CELLULITIS OF UNSPECIFIED PART OF LIMB; L02.619 - 

CUTANEOUS ABSCESS OF UNSPECIFIED FOOT   Status: Acute   Priority: High   

Current Visit: Yes   





(2) Edema extremities


SNOMED Code(s): 029481044


   Code(s): R60.0 - LOCALIZED EDEMA   Status: Acute   Priority: High   Current 

Visit: Yes   





- Problem List Review


Problem List Initiated/Reviewed/Updated: Yes





- My Orders


Last 24 Hours: 


My Active Orders





18 21:30


Foot Comp Min 3V Rt [CR] Stat 














- Assessment/Plan


Last 24 Hours: 


My Active Orders





18 21:30


Foot Comp Min 3V Rt [CR] Stat 











Plan: 


infection of right foot including 2nd toe


-xray negative for broken bone, complete radiology report pending


-start tonight; Keflex 500mg one two times a day for 7 days


-elevated legs as much as possible, no prolonged walking, standing





Edema of lower legs


-restart "Kidney" medication as soon as possible


-take medications as directed.





Return to Clinic, Urgent Care or ER for any worsen of symptoms or not improved.

## 2018-04-26 NOTE — CR
Foot Comp Min 3V Rt

 

HISTORY: rt 2nd toe pain.

 

FINDINGS:

No acute fracture or dislocation is identified.  Bony architecture and joint spaces are preserved.  P
lantar calcaneal spur is noted. Soft tissues are unremarkable.

 

IMPRESSION:

No acute right foot abnormality is identified.

## 2018-06-27 ENCOUNTER — HOSPITAL ENCOUNTER (EMERGENCY)
Dept: HOSPITAL 11 - JP.ED | Age: 68
Discharge: ANOTHER HOSPITAL | End: 2018-06-27
Payer: MEDICARE

## 2018-06-27 VITALS — DIASTOLIC BLOOD PRESSURE: 82 MMHG | SYSTOLIC BLOOD PRESSURE: 139 MMHG

## 2018-06-27 DIAGNOSIS — Z79.82: ICD-10-CM

## 2018-06-27 DIAGNOSIS — Z79.899: ICD-10-CM

## 2018-06-27 DIAGNOSIS — M10.072: Primary | ICD-10-CM

## 2018-06-27 DIAGNOSIS — I10: ICD-10-CM

## 2018-06-27 NOTE — EDM.PDOC
ED HPI GENERAL MEDICAL PROBLEM





- General


Chief Complaint: Lower Extremity Injury/Pain


Stated Complaint: PAIN IN LEFT BIG TOE HURTS TO WALK


Time Seen by Provider: 18 22:55


Source of Information: Reports: Patient, Old Records, RN Notes Reviewed


History Limitations: Reports: No Limitations





- History of Present Illness


INITIAL COMMENTS - FREE TEXT/NARRATIVE: 





Drove herself here





Chief complaint


Left big toe pain





History of present illness


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


History of gout of the left big toe cellulitis of left foot


Right toe started developing today, painful to walk and weight-bear.


No history of injury


No other joints involved currently.


  ** Left Feet


Pain Score (Numeric/FACES): 9





- Related Data


 Allergies











Allergy/AdvReac Type Severity Reaction Status Date / Time


 


No Known Allergies Allergy   Verified 17 04:08











Home Meds: 


 Home Meds





Aspirin [Adult Low Dose Aspirin EC] 81 mg PO DAILY 16 [History]


Lisinopril 5 mg PO DAILY 16 [History]


Metoprolol Succinate 50 mg PO DAILY 16 [History]


Cholecalciferol (Vitamin D3) [Vitamin D] 5,000 unit PO DAILY 16 [History]


Multivitamin [Multivitamins] 1 tab PO DAILY 16 [History]


Betamethasone/Propylene Glyc [Betamethasone DP Aug 0.05%] 30 ml TP ASDIRECTED 10

/27/16 [History]


prednisoLONE Acetate [Pred Forte 1% Ophth Susp] 2 drop EYELF DAILY 07/15/17 [

History]


Chlorthalidone 25 mg PO DAILY 18 [History]


Tamsulosin HCl 0.4 mg PO DAILY 18 [History]


Indomethacin [Indocin] 50 mg PO TID #25 cap 18 [Rx]











Past Medical History


HEENT History: Reports: Cataract, Impaired Vision


Cardiovascular History: Reports: Hypertension


Respiratory History: Reports: Bronchitis, Recurrent


Gastrointestinal History: Reports: Colon Polyp


Genitourinary History: Reports: Renal Calculus, Other (See Below)


Other Genitourinary History: kidney stone removed


OB/GYN History: Reports: Pregnancy


Musculoskeletal History: Reports: Other (See Below)


Other Musculoskeletal History: sciatica


Dermatologic History: Reports: Eczema


Other Dermatologic History: skin rash for swimmers itch.  recent excema scalp





- Infectious Disease History


Infectious Disease History: Reports: Chicken Pox





- Past Surgical History


HEENT Surgical History: Reports: Cataract Surgery


Female  Surgical History: Reports:  Section, Kidney stone extraction, 

Lithotripsy/ESWL





Social & Family History





- Family History


Family Medical History: Unobtainable





- Tobacco Use


Smoking Status *Q: Never Smoker





- Caffeine Use


Caffeine Use: Reports: Soda





- Recreational Drug Use


Recreational Drug Use: No





- Living Situation & Occupation


Living situation: Reports: , with Spouse





Review of Systems





- Review of Systems


Review Of Systems: ROS reveals no pertinent complaints other than HPI.


Constitutional: Reports: No Symptoms


Musculoskeletal: Reports: Joint Pain (Left big toe)


Skin: Reports: Erythema (Left big toe)





ED EXAM, GENERAL





- Physical Exam


Exam: See Below


Exam Limited By: No Limitations


General Appearance: Alert, No Apparent Distress, Other (Appears well, vital 

signs normal)


Respiratory/Chest: No Respiratory Distress, No Accessory Muscle Use


Cardiovascular: Normal Peripheral Pulses, Regular Rate, Rhythm


Extremities: Other (Swelling tender red left MTP joint big toe, passive 

movement painful)


Neurological: Alert, Oriented


Skin Exam: Warm, Dry, Other (No lymphangitis or cellulitis)


Lymphatic: No Adenopathy





Course





- Vital Signs


Last Recorded V/S: 


 Last Vital Signs











Temp  36.8 C   18 22:32


 


Pulse  77   18 22:32


 


Resp  18   18 22:32


 


BP  139/82   18 22:32


 


Pulse Ox  97   18 22:32














- Re-Assessments/Exams


Free Text/Narrative Re-Assessment/Exam: 





18 23:10


68-year-old female with red swollen tender left metatarsal phalangeal joint.


Differential diagnosis would include infectious arthritis, gout, other forms of 

arthritis and injury.


Symptoms and history most consistent with  she has had this previously.





Although she has hypertension, she is using anti-inflammatories before without 

problem and  quick effect.


She has some indomethacin at home she can take and then a prescription will be 

writtenwithgout





Departure





- Departure


Time of Disposition: 23:11


Disposition: Admitted As Inpatient 67


Condition: Good


Clinical Impression: 


Gout involving toe of left foot


Qualifiers:


 Gout etiology: idiopathic Chronicity: acute Qualified Code(s): M10.072 - 

Idiopathic gout, left ankle and foot








- Discharge Information


Prescriptions: 


Indomethacin [Indocin] 50 mg PO TID #25 cap


Instructions:  Gout, Easy-to-Read


Referrals: 


Phillip Cooley MD [Primary Care Provider] - 


Forms:  ED Department Discharge


Additional Instructions: 


See your physician/clinic if not improving within 1 week





Return to emergency if fever, vomiting, or additional joints become inflamed

## 2018-09-30 ENCOUNTER — HOSPITAL ENCOUNTER (EMERGENCY)
Dept: HOSPITAL 11 - JP.ED | Age: 68
Discharge: HOME | End: 2018-09-30
Payer: MEDICARE

## 2018-09-30 VITALS — DIASTOLIC BLOOD PRESSURE: 62 MMHG | SYSTOLIC BLOOD PRESSURE: 100 MMHG

## 2018-09-30 DIAGNOSIS — I10: ICD-10-CM

## 2018-09-30 DIAGNOSIS — H61.21: Primary | ICD-10-CM

## 2018-09-30 DIAGNOSIS — Z79.899: ICD-10-CM

## 2018-09-30 DIAGNOSIS — Z79.82: ICD-10-CM

## 2018-09-30 NOTE — EDM.PDOC
ED HPI GENERAL MEDICAL PROBLEM





- General


Chief Complaint: ENT Problem


Stated Complaint: EAR ISSUES


Time Seen by Provider: 18 13:45


Source of Information: Reports: Patient


History Limitations: Reports: No Limitations





- History of Present Illness


INITIAL COMMENTS - FREE TEXT/NARRATIVE: 





68-year-old female woke up this morning with impaction of cerumen and decreased 

hearing in her right ear. She is leaving on a trip tomorrow so wanted it 

cleaned out.


Onset: Sudden


Associated Symptoms: Reports: No Other Symptoms


  ** Denies


Pain Score (Numeric/FACES): 0





- Related Data


 Allergies











Allergy/AdvReac Type Severity Reaction Status Date / Time


 


No Known Allergies Allergy   Verified 18 13:38











Home Meds: 


 Home Meds





Aspirin [Adult Low Dose Aspirin EC] 81 mg PO DAILY 16 [History]


Lisinopril 5 mg PO DAILY 16 [History]


Metoprolol Succinate 50 mg PO DAILY 16 [History]


Multivitamin [Multivitamins] 1 tab PO DAILY 16 [History]


Betamethasone/Propylene Glyc [Betamethasone DP Aug 0.05%] 30 ml TP ASDIRECTED 10

/27/16 [History]


Chlorthalidone 25 mg PO DAILY 18 [History]











Past Medical History


HEENT History: Reports: Cataract, Impaired Vision


Cardiovascular History: Reports: Hypertension


Respiratory History: Reports: Bronchitis, Recurrent


Gastrointestinal History: Reports: Colon Polyp


Genitourinary History: Reports: Renal Calculus, Other (See Below)


Other Genitourinary History: kidney stone removed


OB/GYN History: Reports: Pregnancy


Musculoskeletal History: Reports: Other (See Below)


Other Musculoskeletal History: sciatica


Dermatologic History: Reports: Eczema


Other Dermatologic History: skin rash for swimmers itch.  recent excema scalp





- Infectious Disease History


Infectious Disease History: Reports: Chicken Pox





- Past Surgical History


HEENT Surgical History: Reports: Cataract Surgery


Female  Surgical History: Reports:  Section, Kidney stone extraction, 

Lithotripsy/ESWL





Social & Family History





- Family History


Family Medical History: Unobtainable





- Tobacco Use


Smoking Status *Q: Never Smoker


Second Hand Smoke Exposure: No





- Caffeine Use


Caffeine Use: Reports: Soda





- Alcohol Use


Days Per Week of Alcohol Use: 0





- Recreational Drug Use


Recreational Drug Use: No





- Living Situation & Occupation


Living situation: Reports: , with Spouse





ED ROS ENT





- Review of Systems


Review Of Systems: See Below


Constitutional: Denies: Fever


HEENT: Denies: Ear Pain


Respiratory: Denies: Shortness of Breath, Cough


GI/Abdominal: Denies: Nausea, Vomiting


Neurological: Denies: Headache





ED EXAM, ENT





- Physical Exam


Exam: See Below


Exam Limited By: No Limitations


General Appearance: Alert, No Apparent Distress


Ears: TM Obscured by Cerumen (Right ear canal is impacted with cerumen, the 

left tympanic membrane appears normal)


Mouth/Throat: Normal Inspection


Respiratory/Chest: No Respiratory Distress





Course





- Vital Signs


Last Recorded V/S: 


 Last Vital Signs











Temp  96.7 F   18 13:38


 


Pulse  71   18 13:38


 


Resp  16   18 13:38


 


BP  100/62   18 13:38


 


Pulse Ox  97   18 13:38














- Re-Assessments/Exams


Free Text/Narrative Re-Assessment/Exam: 





18 14:00


The right ear was irrigated.


18 14:28


With the assistance of a curette and irrigation, most of the wax is removed and 

the patient's symptoms resolved. There was still some wax in the ear canal.





Departure





- Departure


Time of Disposition: 14:43


Disposition: Home, Self-Care 01


Condition: Good


Clinical Impression: 


 Impacted cerumen, right ear








- Discharge Information


Instructions:  Earwax Buildup, Adult


Referrals: 


Phillip Cooley MD [Primary Care Provider] - 


Forms:  ED Department Discharge


Care Plan Goals: 


Keep ears clean if possible, and activity as tolerated.

## 2019-01-06 ENCOUNTER — HOSPITAL ENCOUNTER (EMERGENCY)
Dept: HOSPITAL 11 - JP.ED | Age: 69
Discharge: HOME | End: 2019-01-06
Payer: MEDICARE

## 2019-01-06 VITALS — SYSTOLIC BLOOD PRESSURE: 136 MMHG | DIASTOLIC BLOOD PRESSURE: 61 MMHG

## 2019-01-06 DIAGNOSIS — Z88.1: ICD-10-CM

## 2019-01-06 DIAGNOSIS — J98.01: Primary | ICD-10-CM

## 2019-01-06 DIAGNOSIS — E66.9: ICD-10-CM

## 2019-01-06 DIAGNOSIS — I10: ICD-10-CM

## 2019-01-06 DIAGNOSIS — Z79.82: ICD-10-CM

## 2019-01-06 DIAGNOSIS — Z79.899: ICD-10-CM

## 2019-01-06 DIAGNOSIS — Z88.8: ICD-10-CM

## 2019-01-06 PROCEDURE — 99284 EMERGENCY DEPT VISIT MOD MDM: CPT

## 2019-01-06 PROCEDURE — 94640 AIRWAY INHALATION TREATMENT: CPT

## 2019-01-06 NOTE — EDM.PDOC
ED HPI GENERAL MEDICAL PROBLEM





- General


Chief Complaint: Respiratory Problem


Stated Complaint: COLD, WHEEZING


Time Seen by Provider: 19 01:50


Source of Information: Reports: Patient, Old Records, RN


History Limitations: Reports: No Limitations





- History of Present Illness


INITIAL COMMENTS - FREE TEXT/NARRATIVE: 





67 yo female presents with a cough and wheezing for the past 3 days. No fever. 

Cough minimally productive. Was SOB while lying tonight. No hx of CHF, but has 

had to be given medicine for wheezing in the past. Does not smoke. Minimal 

nasal discharge. 


Onset: Gradual


Onset Date: 19


Duration: Day(s):, Getting Worse


Location: Reports: Chest


Quality: Reports: Other (mild tightness)


Severity: Moderate


Improves with: Reports: None


Worsens with: Reports: Other (lying)


Context: Reports: Other (PHx of wheezing, denies dx of asthma)


Associated Symptoms: Reports: Cough.  Denies: Fever/Chills


Treatments PTA: Reports: Other (see below) (none)


  ** lungs/ ribs


Pain Score (Numeric/FACES): 5





- Related Data


 Allergies











Allergy/AdvReac Type Severity Reaction Status Date / Time


 


amoxicillin [From Augmentin] AdvReac  Diarrhea Verified 19 01:44


 


clavulanic acid AdvReac  Diarrhea Verified 19 01:44





[From Augmentin]     











Home Meds: 


 Home Meds





Aspirin [Adult Low Dose Aspirin EC] 81 mg PO DAILY 16 [History]


Lisinopril 5 mg PO DAILY 16 [History]


Metoprolol Succinate 50 mg PO DAILY 16 [History]


Chlorthalidone 12.5 mg PO DAILY 18 [History]


Allopurinol [Zyloprim] 300 mg PO DAILY 18 [History]


Cholecalciferol (Vitamin D3) [Vitamin D3] 2,000 unit PO DAILY 18 [History]


Hydrocortisone [Hydrocortisone 2.5% Crm] 1 applic TOP BID 18 [History]


Indomethacin [Indocin] 50 mg PO TID PRN 18 [History]


Ketoconazole [Ketoconazole 2%] 1 applic TOP Q3D PRN 18 [History]


Mometasone Furoate [Elocon 0.1% Crm] 1 applic TOP DAILY PRN 18 [History]


Albuterol [Ventolin HFA] 2 inh IH Q4HR PRN #1 inhaler 19 [Rx]


predniSONE [Prednisone] 10 mg PO BID #14 tablet 19 [Rx]











Past Medical History


HEENT History: Reports: Cataract, Impaired Vision


Cardiovascular History: Reports: Hypertension


Respiratory History: Reports: Bronchitis, Recurrent


Gastrointestinal History: Reports: Colon Polyp


Genitourinary History: Reports: Renal Calculus, Other (See Below)


Other Genitourinary History: kidney stone removed


OB/GYN History: Reports: Pregnancy


Musculoskeletal History: Reports: Gout, Other (See Below)


Other Musculoskeletal History: sciatica


Endocrine/Metabolic History: Reports: Obesity/BMI 30+


Dermatologic History: Reports: Eczema


Other Dermatologic History: skin rash for swimmers itch.  recent excema scalp





- Infectious Disease History


Infectious Disease History: Reports: Chicken Pox





- Past Surgical History


HEENT Surgical History: Reports: Cataract Surgery


GI Surgical History: Reports: Colonoscopy


Female  Surgical History: Reports:  Section, Kidney stone extraction, 

Lithotripsy/ESWL





Social & Family History





- Family History


Family Medical History: Unobtainable


Oncologic: Reports: Colon





- Tobacco Use


Smoking Status *Q: Never Smoker





- Caffeine Use


Caffeine Use: Reports: Soda





- Recreational Drug Use


Recreational Drug Use: No





- Living Situation & Occupation


Living situation: Reports: , with Spouse





ED ROS GENERAL





- Review of Systems


Review Of Systems: See Below


Constitutional: Reports: No Symptoms


HEENT: Reports: No Symptoms


Respiratory: Reports: Shortness of Breath, Wheezing, Cough.  Denies: Pleuritic 

Chest Pain, Sputum, Hemoptysis


Cardiovascular: Reports: No Symptoms


GI/Abdominal: Reports: No Symptoms


: Reports: No Symptoms


Musculoskeletal: Reports: No Symptoms


Skin: Reports: No Symptoms


Neurological: Reports: No Symptoms





ED EXAM, GENERAL





- Physical Exam


Exam: See Below


Exam Limited By: No Limitations


General Appearance: Alert, WD/WN, No Apparent Distress, Obese


Eye Exam: Bilateral Eye: Normal Inspection


Ears: Normal External Exam, Normal Canal, Hearing Grossly Normal, Normal TMs


Ear Exam: Bilateral Ear: Auricle Normal, Canal Normal, TM normal


Nose: Normal Inspection, No Blood


Throat/Mouth: Normal Inspection, Normal Lips, Normal Oropharynx, Normal Voice, 

No Airway Compromise


Head: Atraumatic, Normocephalic


Neck: Normal Inspection


Respiratory/Chest: No Respiratory Distress, No Accessory Muscle Use, Wheezing


Cardiovascular: Regular Rate, Rhythm, No Edema


Back Exam: Normal Inspection


Extremities: Normal Inspection, Normal Range of Motion, Non-Tender, No Pedal 

Edema


Neurological: Alert, Oriented, CN II-XII Intact, Normal Cognition, No Motor/

Sensory Deficits


Psychiatric: Normal Affect, Normal Mood


Skin Exam: Warm, Dry, Intact, Normal Color, No Rash





Course





- Vital Signs


Text/Narrative:: 





albuterol neb-improved aeration and reduced wheezing


Last Recorded V/S: 





 Last Vital Signs











Temp  36.6 C   19 01:48


 


Pulse  80   19 01:48


 


Resp  20   19 01:48


 


BP  136/61   19 01:48


 


Pulse Ox  94 L  19 01:48














- Orders/Labs/Meds


Orders: 





 Active Orders 24 hr











 Category Date Time Status


 


 RT Aerosol Therapy [RC] ASDIRECTED Care  19 02:02 Ordered


 


 Albuterol [Proventil Neb Soln] Med  19 02:02 Once





 2.5 mg NEB ONETIME ONE   














Departure





- Departure


Time of Disposition: 02:30


Disposition: Home, Self-Care 01


Condition: Fair


Clinical Impression: 


 Bronchospasm








- Discharge Information


*PRESCRIPTION DRUG MONITORING PROGRAM REVIEWED*: Not Applicable


*COPY OF PRESCRIPTION DRUG MONITORING REPORT IN PATIENT FIDEL: Not Applicable


Instructions:  Bronchospasm, Adult, Easy-to-Read


Referrals: 


Phillip Cooley MD [Primary Care Provider] - 


Additional Instructions: 


Use albuterol and prednisone as directed. Recheck with your provider this next 

week, return if worse. 





- My Orders


Last 24 Hours: 





My Active Orders





19 02:02


RT Aerosol Therapy [RC] ASDIRECTED 


Albuterol [Proventil Neb Soln]   2.5 mg NEB ONETIME ONE 














- Assessment/Plan


Last 24 Hours: 





My Active Orders





19 02:02


RT Aerosol Therapy [RC] ASDIRECTED 


Albuterol [Proventil Neb Soln]   2.5 mg NEB ONETIME ONE